# Patient Record
Sex: MALE | Race: WHITE | NOT HISPANIC OR LATINO | URBAN - METROPOLITAN AREA
[De-identification: names, ages, dates, MRNs, and addresses within clinical notes are randomized per-mention and may not be internally consistent; named-entity substitution may affect disease eponyms.]

---

## 2017-03-20 ENCOUNTER — FOLLOW UP ESTABLISHED (OUTPATIENT)
Dept: URBAN - METROPOLITAN AREA CLINIC 10 | Facility: CLINIC | Age: 72
End: 2017-03-20
Payer: COMMERCIAL

## 2017-03-20 PROCEDURE — 92012 INTRM OPH EXAM EST PATIENT: CPT | Performed by: OPTOMETRIST

## 2017-03-20 PROCEDURE — 92083 EXTENDED VISUAL FIELD XM: CPT | Performed by: OPTOMETRIST

## 2017-03-20 PROCEDURE — 92133 CPTRZD OPH DX IMG PST SGM ON: CPT | Performed by: OPTOMETRIST

## 2017-03-20 ASSESSMENT — INTRAOCULAR PRESSURE
OD: 13
OS: 14

## 2017-10-03 ENCOUNTER — FOLLOW UP ESTABLISHED (OUTPATIENT)
Dept: URBAN - METROPOLITAN AREA CLINIC 10 | Facility: CLINIC | Age: 72
End: 2017-10-03
Payer: COMMERCIAL

## 2017-10-03 PROCEDURE — 92133 CPTRZD OPH DX IMG PST SGM ON: CPT | Performed by: OPTOMETRIST

## 2017-10-03 PROCEDURE — 92014 COMPRE OPH EXAM EST PT 1/>: CPT | Performed by: OPTOMETRIST

## 2017-10-03 ASSESSMENT — VISUAL ACUITY
OD: 20/20
OS: 20/20

## 2017-10-03 ASSESSMENT — KERATOMETRY
OD: 42.63
OS: 42.25

## 2017-10-03 ASSESSMENT — INTRAOCULAR PRESSURE
OD: 14
OS: 13

## 2018-05-11 ENCOUNTER — FOLLOW UP ESTABLISHED (OUTPATIENT)
Dept: URBAN - METROPOLITAN AREA CLINIC 10 | Facility: CLINIC | Age: 73
End: 2018-05-11
Payer: COMMERCIAL

## 2018-05-11 PROCEDURE — 92082 INTERMEDIATE VISUAL FIELD XM: CPT | Performed by: OPTOMETRIST

## 2018-05-11 PROCEDURE — 99213 OFFICE O/P EST LOW 20 MIN: CPT | Performed by: OPTOMETRIST

## 2018-05-11 ASSESSMENT — KERATOMETRY
OD: 42.75
OS: 42.25

## 2018-05-11 ASSESSMENT — VISUAL ACUITY
OD: 20/20
OS: 20/20

## 2018-05-11 ASSESSMENT — INTRAOCULAR PRESSURE
OS: 14
OD: 14

## 2018-12-11 ENCOUNTER — FOLLOW UP ESTABLISHED (OUTPATIENT)
Dept: URBAN - METROPOLITAN AREA CLINIC 10 | Facility: CLINIC | Age: 73
End: 2018-12-11
Payer: COMMERCIAL

## 2018-12-11 DIAGNOSIS — H04.123 TEAR FILM INSUFFICIENCY OF BILATERAL LACRIMAL GLANDS: ICD-10-CM

## 2018-12-11 PROCEDURE — 92250 FUNDUS PHOTOGRAPHY W/I&R: CPT | Performed by: OPTOMETRIST

## 2018-12-11 PROCEDURE — 92014 COMPRE OPH EXAM EST PT 1/>: CPT | Performed by: OPTOMETRIST

## 2018-12-11 ASSESSMENT — INTRAOCULAR PRESSURE
OD: 18
OS: 18

## 2018-12-11 ASSESSMENT — VISUAL ACUITY
OD: 20/20
OS: 20/20

## 2018-12-11 ASSESSMENT — KERATOMETRY
OD: 42.63
OS: 42.13

## 2019-06-11 ENCOUNTER — FOLLOW UP ESTABLISHED (OUTPATIENT)
Dept: URBAN - METROPOLITAN AREA CLINIC 10 | Facility: CLINIC | Age: 74
End: 2019-06-11
Payer: COMMERCIAL

## 2019-06-11 PROCEDURE — 92133 CPTRZD OPH DX IMG PST SGM ON: CPT | Performed by: OPTOMETRIST

## 2019-06-11 PROCEDURE — 92012 INTRM OPH EXAM EST PATIENT: CPT | Performed by: OPTOMETRIST

## 2019-06-11 PROCEDURE — 92083 EXTENDED VISUAL FIELD XM: CPT | Performed by: OPTOMETRIST

## 2019-06-11 ASSESSMENT — INTRAOCULAR PRESSURE
OS: 16
OD: 16

## 2019-10-09 ENCOUNTER — FOLLOW UP ESTABLISHED (OUTPATIENT)
Dept: URBAN - METROPOLITAN AREA CLINIC 10 | Facility: CLINIC | Age: 74
End: 2019-10-09
Payer: COMMERCIAL

## 2019-10-09 PROCEDURE — 92014 COMPRE OPH EXAM EST PT 1/>: CPT | Performed by: OPTOMETRIST

## 2019-10-09 ASSESSMENT — VISUAL ACUITY
OS: 20/20
OD: 20/25

## 2019-10-09 ASSESSMENT — KERATOMETRY: OD: 42.63

## 2019-10-09 ASSESSMENT — INTRAOCULAR PRESSURE
OD: 16
OS: 16

## 2020-07-27 ENCOUNTER — FOLLOW UP ESTABLISHED (OUTPATIENT)
Dept: URBAN - METROPOLITAN AREA CLINIC 10 | Facility: CLINIC | Age: 75
End: 2020-07-27
Payer: COMMERCIAL

## 2020-07-27 DIAGNOSIS — H25.813 COMBINED FORMS OF AGE-RELATED CATARACT, BILATERAL: ICD-10-CM

## 2020-07-27 DIAGNOSIS — H00.012 HORDEOLUM, RIGHT LOWER LID: ICD-10-CM

## 2020-07-27 PROCEDURE — 92083 EXTENDED VISUAL FIELD XM: CPT | Performed by: OPTOMETRIST

## 2020-07-27 PROCEDURE — 92012 INTRM OPH EXAM EST PATIENT: CPT | Performed by: OPTOMETRIST

## 2020-07-27 PROCEDURE — 92134 CPTRZ OPH DX IMG PST SGM RTA: CPT | Performed by: OPTOMETRIST

## 2020-07-27 PROCEDURE — 92133 CPTRZD OPH DX IMG PST SGM ON: CPT | Performed by: OPTOMETRIST

## 2020-07-27 ASSESSMENT — INTRAOCULAR PRESSURE
OS: 14
OD: 14

## 2020-09-11 ENCOUNTER — FOLLOW UP ESTABLISHED (OUTPATIENT)
Dept: URBAN - METROPOLITAN AREA CLINIC 10 | Facility: CLINIC | Age: 75
End: 2020-09-11
Payer: COMMERCIAL

## 2020-09-11 DIAGNOSIS — H40.013 OPEN ANGLE WITH BORDERLINE FINDINGS, LOW RISK, BILATERAL: Primary | ICD-10-CM

## 2020-09-11 DIAGNOSIS — H04.561 STENOSIS OF RIGHT LACRIMAL PUNCTUM: ICD-10-CM

## 2020-09-11 PROCEDURE — 92014 COMPRE OPH EXAM EST PT 1/>: CPT | Performed by: OPTOMETRIST

## 2020-09-11 ASSESSMENT — INTRAOCULAR PRESSURE
OS: 16
OD: 16

## 2020-09-11 ASSESSMENT — VISUAL ACUITY
OS: 20/25
OD: 20/40

## 2021-02-03 ENCOUNTER — OFFICE VISIT (OUTPATIENT)
Dept: URBAN - METROPOLITAN AREA CLINIC 13 | Facility: CLINIC | Age: 76
End: 2021-02-03
Payer: COMMERCIAL

## 2021-02-03 DIAGNOSIS — H27.00 APHAKIA: ICD-10-CM

## 2021-02-03 PROCEDURE — 76512 OPH US DX B-SCAN: CPT | Performed by: OPHTHALMOLOGY

## 2021-02-03 PROCEDURE — 99204 OFFICE O/P NEW MOD 45 MIN: CPT | Performed by: OPHTHALMOLOGY

## 2021-02-03 PROCEDURE — 92134 CPTRZ OPH DX IMG PST SGM RTA: CPT | Performed by: OPHTHALMOLOGY

## 2021-02-03 RX ORDER — PREDNISOLONE ACETATE 10 MG/ML
1 % SUSPENSION/ DROPS OPHTHALMIC
Qty: 5 | Refills: 3 | Status: INACTIVE
Start: 2021-02-03 | End: 2021-03-01

## 2021-02-03 RX ORDER — OFLOXACIN 3 MG/ML
0.3 % SOLUTION/ DROPS OPHTHALMIC
Qty: 5 | Refills: 0 | Status: INACTIVE
Start: 2021-02-03 | End: 2021-03-01

## 2021-02-03 ASSESSMENT — INTRAOCULAR PRESSURE
OS: 13
OD: 15

## 2021-02-03 NOTE — IMPRESSION/PLAN
Impression: Lens fragment in eye following cataract surgery: H59.029. Plan: Patient referred for evaluation of RLF. Exam demonstrates corneal dfolds, normal IOP, likely aphakia, and hazy view posteriorly. Bscan demonstrates RLF without RT or RD. Recommend surgery. R/B/A discussed and patient elects to proceed. Will increase PF to 6x/day. Will plan for secondary IOL placement at a later surgery (either ACIOL or scleral fixated IOL).   

RTC: 23 PPV/poss phacofrag/ EL x RLF OD

## 2021-02-03 NOTE — IMPRESSION/PLAN
Impression: Glaucoma Suspect: H40.9. Plan: Exam demonstrates mild glaucomatous cupping OS. Fortunately, there is no NVI today. IOP is WNL. Discussed surgery vs laser vs gtts vs observation.   Recommend observation for now

## 2021-02-08 ENCOUNTER — Encounter (OUTPATIENT)
Dept: URBAN - METROPOLITAN AREA EXTERNAL CLINIC 14 | Facility: EXTERNAL CLINIC | Age: 76
End: 2021-02-08
Payer: COMMERCIAL

## 2021-02-08 PROCEDURE — 66852 REMOVAL OF LENS MATERIAL: CPT | Performed by: OPHTHALMOLOGY

## 2021-02-09 ENCOUNTER — POST-OPERATIVE VISIT (OUTPATIENT)
Dept: URBAN - METROPOLITAN AREA CLINIC 13 | Facility: CLINIC | Age: 76
End: 2021-02-09
Payer: COMMERCIAL

## 2021-02-09 PROCEDURE — 99024 POSTOP FOLLOW-UP VISIT: CPT | Performed by: OPHTHALMOLOGY

## 2021-02-09 ASSESSMENT — INTRAOCULAR PRESSURE
OS: 11
OD: 16

## 2021-02-09 NOTE — IMPRESSION/PLAN
Impression: S/P 23 PPV/poss phacofrag/ EL x RLF OD - 1 Day. Lens fragment in eye following cataract surgery  H59.029. Excellent post op course. Post operative instructions reviewed. Condition is improving. No signs or symptoms of infection. 
- increase PF to 6x/day, ok off IOP drops for now Plan: RTC: 1 week POS

## 2021-02-16 ENCOUNTER — POST-OPERATIVE VISIT (OUTPATIENT)
Dept: URBAN - METROPOLITAN AREA CLINIC 13 | Facility: CLINIC | Age: 76
End: 2021-02-16
Payer: COMMERCIAL

## 2021-02-16 PROCEDURE — 99024 POSTOP FOLLOW-UP VISIT: CPT | Performed by: OPHTHALMOLOGY

## 2021-02-16 ASSESSMENT — INTRAOCULAR PRESSURE
OS: 15
OD: 16

## 2021-02-16 NOTE — IMPRESSION/PLAN
Impression: S/P 23 PPV/poss phacofrag/ EL x RLF OD - 8 Days. Lens fragment in eye following cataract surgery  H59.029.  Excellent post op course   Post operative instructions reviewed - Condition is improving - Plan: 3-4 wks with POS, with OCT OU

## 2021-03-01 ENCOUNTER — POST-OPERATIVE VISIT (OUTPATIENT)
Dept: URBAN - METROPOLITAN AREA CLINIC 13 | Facility: CLINIC | Age: 76
End: 2021-03-01
Payer: COMMERCIAL

## 2021-03-01 PROCEDURE — 99024 POSTOP FOLLOW-UP VISIT: CPT | Performed by: OPHTHALMOLOGY

## 2021-03-01 ASSESSMENT — INTRAOCULAR PRESSURE
OD: 10
OS: 12

## 2021-03-01 NOTE — IMPRESSION/PLAN
Impression: S/P 23 PPV/poss phacofrag/ EL x RLF OD - 21 Days. Lens fragment in eye following cataract surgery  H59.029. Excellent post op course   Post operative instructions reviewed - Condition is improving - Plan: No s/s of RD/infection VA/IOP acceptable OCT OU - mild diffuse thickening OD, no SRF/IRF /292 Does have what looks like rebound iritis after his recent taper off PF. Currently only taking Vigamox QID and timolol/dorzolamide/alphagan BID (brought his drops with him). Noticed onset on Saturday. Timing since surgery and since taper off PF is suggestive of rebound iritis. Try PF q1-2 OD WA. Stressed importance of f/u tomorrow. RBA's of plan vs T&I today d/w patient who agrees. He voices understanding of importance of adherence and f/u. 

Tomorrow with SI

## 2021-03-02 ENCOUNTER — POST-OPERATIVE VISIT (OUTPATIENT)
Dept: URBAN - METROPOLITAN AREA CLINIC 13 | Facility: CLINIC | Age: 76
End: 2021-03-02
Payer: COMMERCIAL

## 2021-03-02 PROCEDURE — 99024 POSTOP FOLLOW-UP VISIT: CPT | Performed by: OPHTHALMOLOGY

## 2021-03-02 PROCEDURE — 67028 INJECTION EYE DRUG: CPT | Performed by: OPHTHALMOLOGY

## 2021-03-02 PROCEDURE — 65800 DRAINAGE OF EYE: CPT | Performed by: OPHTHALMOLOGY

## 2021-03-02 ASSESSMENT — INTRAOCULAR PRESSURE
OD: 9
OS: 12

## 2021-03-03 ENCOUNTER — POST-OPERATIVE VISIT (OUTPATIENT)
Dept: URBAN - METROPOLITAN AREA CLINIC 13 | Facility: CLINIC | Age: 76
End: 2021-03-03
Payer: COMMERCIAL

## 2021-03-03 PROCEDURE — 99024 POSTOP FOLLOW-UP VISIT: CPT | Performed by: OPHTHALMOLOGY

## 2021-03-03 ASSESSMENT — INTRAOCULAR PRESSURE
OS: 13
OD: 14

## 2021-03-03 NOTE — IMPRESSION/PLAN
Impression: 1) S/P 23 PPV/poss phacofrag/ EL x RLF OD - 23 Days. Other endophthalmitis  H44.19. Excellent post op course   Post operative instructions reviewed - Condition is improving -
2) Endophthalmitis after suture removal -s/p tap/inject 3/2/2021 Plan: 1) Doing well 2) Pain improved after tap/inject. Increase PF to q1-2 hr while awake. Continue Vigamox RTC 1 wk EP, with OCT OU

## 2021-03-09 ENCOUNTER — POST-OPERATIVE VISIT (OUTPATIENT)
Dept: URBAN - METROPOLITAN AREA CLINIC 13 | Facility: CLINIC | Age: 76
End: 2021-03-09
Payer: COMMERCIAL

## 2021-03-09 DIAGNOSIS — H44.19 OTHER ENDOPHTHALMITIS: ICD-10-CM

## 2021-03-09 PROCEDURE — 99024 POSTOP FOLLOW-UP VISIT: CPT | Performed by: OPHTHALMOLOGY

## 2021-03-09 PROCEDURE — 92134 CPTRZ OPH DX IMG PST SGM RTA: CPT | Performed by: OPHTHALMOLOGY

## 2021-03-09 RX ORDER — PREDNISOLONE ACETATE 10 MG/ML
1 % SUSPENSION/ DROPS OPHTHALMIC
Qty: 5 | Refills: 3 | Status: INACTIVE
Start: 2021-03-09 | End: 2021-06-09

## 2021-03-09 RX ORDER — DORZOLAMIDE HYDROCHLORIDE AND TIMOLOL MALEATE 20; 5 MG/ML; MG/ML
SOLUTION/ DROPS OPHTHALMIC
Qty: 5 | Refills: 2 | Status: INACTIVE
Start: 2021-03-09 | End: 2021-07-20

## 2021-03-09 RX ORDER — PREDNISOLONE ACETATE 10 MG/ML
1 % SUSPENSION/ DROPS OPHTHALMIC
Qty: 5 | Refills: 3 | Status: INACTIVE
Start: 2021-03-09 | End: 2021-03-09

## 2021-03-09 RX ORDER — BRIMONIDINE TARTRATE, TIMOLOL MALEATE 2; 5 MG/ML; MG/ML
SOLUTION/ DROPS OPHTHALMIC
Qty: 5 | Refills: 2 | Status: INACTIVE
Start: 2021-03-09 | End: 2021-03-09

## 2021-03-09 ASSESSMENT — INTRAOCULAR PRESSURE
OS: 14
OD: 30

## 2021-03-09 NOTE — IMPRESSION/PLAN
Impression: S/P 23 PPV/poss phacofrag/ EL x RLF OD - 29 Days. Other endophthalmitis  H44.19. Excellent post op course   Post operative instructions reviewed - Condition is improving -IOP elevated due to steroid response.   Will initiate PF taper and start Cosopt BID Plan: 1 wk POS with OCT OU

## 2021-03-16 ENCOUNTER — POST-OPERATIVE VISIT (OUTPATIENT)
Dept: URBAN - METROPOLITAN AREA CLINIC 13 | Facility: CLINIC | Age: 76
End: 2021-03-16
Payer: COMMERCIAL

## 2021-03-16 PROCEDURE — 99024 POSTOP FOLLOW-UP VISIT: CPT | Performed by: OPHTHALMOLOGY

## 2021-03-16 PROCEDURE — 92134 CPTRZ OPH DX IMG PST SGM RTA: CPT | Performed by: OPHTHALMOLOGY

## 2021-03-16 ASSESSMENT — INTRAOCULAR PRESSURE
OS: 12
OD: 14

## 2021-03-16 NOTE — IMPRESSION/PLAN
Impression: 1) S/P 23 PPV/poss phacofrag/ EL x RLF OD . Lens fragment in eye following cataract surgery  H59.029. Excellent post op course   Post operative instructions reviewed - Condition is improving -
2) Post suture removal endophthalmitis- inflammation and vision continues to improve. IOP is improved after PF taper and initiation of Cosopt. Cont Cosopt and PF BID.  Plan: RTC: 2 wks POS with OCT OU

## 2021-03-30 ENCOUNTER — POST-OPERATIVE VISIT (OUTPATIENT)
Dept: URBAN - METROPOLITAN AREA CLINIC 13 | Facility: CLINIC | Age: 76
End: 2021-03-30
Payer: COMMERCIAL

## 2021-03-30 PROCEDURE — 99024 POSTOP FOLLOW-UP VISIT: CPT | Performed by: OPHTHALMOLOGY

## 2021-03-30 ASSESSMENT — INTRAOCULAR PRESSURE
OS: 12
OD: 11

## 2021-03-30 NOTE — IMPRESSION/PLAN
Impression: S/P 23 PPV/poss phacofrag/ EL x RLF OD - 50 Days. Lens fragment in eye following cataract surgery  H59.029. Excellent post op course   Post operative instructions reviewed - Condition is improving - Plan: Taper PF from BID to off (1gtt/day/2 wks). Continue Brimonidine RTC 1 mo POS with OCT

## 2021-04-27 ENCOUNTER — POST-OPERATIVE VISIT (OUTPATIENT)
Dept: URBAN - METROPOLITAN AREA CLINIC 13 | Facility: CLINIC | Age: 76
End: 2021-04-27
Payer: COMMERCIAL

## 2021-04-27 PROCEDURE — 92134 CPTRZ OPH DX IMG PST SGM RTA: CPT | Performed by: OPHTHALMOLOGY

## 2021-04-27 PROCEDURE — 99024 POSTOP FOLLOW-UP VISIT: CPT | Performed by: OPHTHALMOLOGY

## 2021-04-27 ASSESSMENT — INTRAOCULAR PRESSURE
OS: 12
OD: 15

## 2021-04-27 NOTE — IMPRESSION/PLAN
Impression: S/P 23 PPV/poss phacofrag/ EL x RLF OD - 78 Days. Lens fragment in eye following cataract surgery  H59.029. Excellent post op course. Post operative instructions reviewed. Condition is improving. Plan: Ok to d/c PF. Continue Brimonidine. 

RTC 6 mo with OCT OU

## 2021-06-09 ENCOUNTER — OFFICE VISIT (OUTPATIENT)
Dept: URBAN - METROPOLITAN AREA CLINIC 13 | Facility: CLINIC | Age: 76
End: 2021-06-09
Payer: COMMERCIAL

## 2021-06-09 DIAGNOSIS — H59.029 LENS FRAGMENT IN EYE FOLLOWING CATARACT SURGERY: ICD-10-CM

## 2021-06-09 PROCEDURE — 92014 COMPRE OPH EXAM EST PT 1/>: CPT | Performed by: OPHTHALMOLOGY

## 2021-06-09 PROCEDURE — 92134 CPTRZ OPH DX IMG PST SGM RTA: CPT | Performed by: OPHTHALMOLOGY

## 2021-06-09 RX ORDER — PREDNISOLONE ACETATE 10 MG/ML
1 % SUSPENSION/ DROPS OPHTHALMIC
Qty: 5 | Refills: 3 | Status: INACTIVE
Start: 2021-06-09 | End: 2021-08-17

## 2021-06-09 ASSESSMENT — INTRAOCULAR PRESSURE
OS: 13
OD: 12

## 2021-06-09 NOTE — IMPRESSION/PLAN
Impression: Lens fragment in eye following cataract surgery  H59.029.
-s/p 23 PPV/poss phacofrag/EL OD (02/08/2021)-SI Plan: See plan for inflammation. No obvious RLF present to explain recurrent inflammation. Continue Cosopt BID. IOP ok.

## 2021-06-09 NOTE — IMPRESSION/PLAN
Impression: Vitreous degeneration, left eye: H43.812. Plan: Exam demonstrates a PVD without any RD or RT on exam.  Discussed R/B/A of PPV vs observation for the floaters. The floaters are not debilitating and so observation was recommended. Reassurance provided. RDW discussed. Precautions discussed with the patient.

## 2021-06-23 ENCOUNTER — OFFICE VISIT (OUTPATIENT)
Dept: URBAN - METROPOLITAN AREA CLINIC 13 | Facility: CLINIC | Age: 76
End: 2021-06-23
Payer: COMMERCIAL

## 2021-06-23 PROCEDURE — 92012 INTRM OPH EXAM EST PATIENT: CPT | Performed by: OPHTHALMOLOGY

## 2021-06-23 PROCEDURE — 92134 CPTRZ OPH DX IMG PST SGM RTA: CPT | Performed by: OPHTHALMOLOGY

## 2021-06-23 RX ORDER — BRIMONIDINE TARTRATE 2 MG/ML
0.2 % SOLUTION/ DROPS OPHTHALMIC
Qty: 5 | Refills: 3 | Status: INACTIVE
Start: 2021-06-23 | End: 2021-09-21

## 2021-06-23 ASSESSMENT — INTRAOCULAR PRESSURE
OS: 13
OD: 13

## 2021-06-23 NOTE — IMPRESSION/PLAN
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 Plan: Patient presented with recurrent pain on 6/9/2021. Patient notes pain improved with PF QID, and he is now tapered to BID. Exam and OCT demonstrate stable inflammation and worse CME. Recommend increased PF to QID.   If no impovement with drops, may require repeat PPV to remove ERM and search for any remaining RLF

RTC 1 mo with OCT OU

## 2021-06-23 NOTE — IMPRESSION/PLAN
Impression: Glaucoma Suspect: H40.9. Plan: Exam demonstrates mild glaucomatous cupping OS. Fortunately, there is no NVI today. IOP is WNL. Discussed surgery vs laser vs gtts vs observation.   Recommend add brimonidine to Cosopt for now

## 2021-07-20 ENCOUNTER — OFFICE VISIT (OUTPATIENT)
Dept: URBAN - METROPOLITAN AREA CLINIC 13 | Facility: CLINIC | Age: 76
End: 2021-07-20
Payer: COMMERCIAL

## 2021-07-20 PROCEDURE — 92134 CPTRZ OPH DX IMG PST SGM RTA: CPT | Performed by: OPHTHALMOLOGY

## 2021-07-20 PROCEDURE — 92012 INTRM OPH EXAM EST PATIENT: CPT | Performed by: OPHTHALMOLOGY

## 2021-07-20 RX ORDER — DORZOLAMIDE HYDROCHLORIDE AND TIMOLOL MALEATE 20; 5 MG/ML; MG/ML
SOLUTION/ DROPS OPHTHALMIC
Qty: 5 | Refills: 2 | Status: INACTIVE
Start: 2021-07-20 | End: 2021-09-21

## 2021-07-20 ASSESSMENT — INTRAOCULAR PRESSURE
OD: 12
OS: 13

## 2021-07-20 NOTE — IMPRESSION/PLAN
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 Plan: Patient presented with recurrent pain on 6/9/2021. Patient notes pain improved with increasing PF from BID to QID. IOP ok on gtts. Exam and OCT demonstrate improved inflammation and CME. Recommend taper PF to TID. If no improvement with drops, may require repeat PPV to remove ERM and search for any remaining RLF; this does not appear to be related to persistent infection as patient's symptoms appear to improve without antibiotics.   

RTC 1 mo with OCT OU

## 2021-08-17 ENCOUNTER — OFFICE VISIT (OUTPATIENT)
Dept: URBAN - METROPOLITAN AREA CLINIC 13 | Facility: CLINIC | Age: 76
End: 2021-08-17
Payer: COMMERCIAL

## 2021-08-17 PROCEDURE — 92012 INTRM OPH EXAM EST PATIENT: CPT | Performed by: OPHTHALMOLOGY

## 2021-08-17 PROCEDURE — 92134 CPTRZ OPH DX IMG PST SGM RTA: CPT | Performed by: OPHTHALMOLOGY

## 2021-08-17 RX ORDER — PREDNISOLONE ACETATE 10 MG/ML
1 % SUSPENSION/ DROPS OPHTHALMIC
Qty: 5 | Refills: 3 | Status: INACTIVE
Start: 2021-08-17 | End: 2022-02-18

## 2021-08-17 ASSESSMENT — INTRAOCULAR PRESSURE
OS: 15
OD: 13

## 2021-08-17 NOTE — IMPRESSION/PLAN
Impression: Glaucoma Suspect: H40.9.
- possible allergy to brimonidine Plan: Exam demonstrates mild glaucomatous cupping OU. Fortunately, there is no NVI today. IOP is WNL. Discussed surgery vs laser vs gtts vs observation. Recommend cont Cosopt for now and d/c brimonidine.

## 2021-08-17 NOTE — IMPRESSION/PLAN
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 Plan: Patient presented with recurrent pain on 6/9/2021. Patient notes pain improved with increasing PF from BID to QID and this has remained improved on PF TID. IOP ok on gtts. Exam and OCT demonstrate improved inflammation and CME. Recommend continue PF TID. If no improvement with drops, may require repeat PPV to remove ERM and search for any remaining RLF; this does not appear to be related to persistent infection as patient's symptoms appear to improve without antibiotics. Patient notes persistent discharge and exam demonstrates papillae. Rec d/c brimonidine.   

RTC 1 mo with OCT OU

## 2021-09-21 ENCOUNTER — OFFICE VISIT (OUTPATIENT)
Dept: URBAN - METROPOLITAN AREA CLINIC 13 | Facility: CLINIC | Age: 76
End: 2021-09-21
Payer: COMMERCIAL

## 2021-09-21 PROCEDURE — 92134 CPTRZ OPH DX IMG PST SGM RTA: CPT | Performed by: OPHTHALMOLOGY

## 2021-09-21 PROCEDURE — 92012 INTRM OPH EXAM EST PATIENT: CPT | Performed by: OPHTHALMOLOGY

## 2021-09-21 ASSESSMENT — INTRAOCULAR PRESSURE
OS: 14
OD: 19

## 2021-09-21 NOTE — IMPRESSION/PLAN
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 Patient presented with recurrent pain on 6/9/2021. Plan:  Patient notes pain has remained improved on PF TID. IOP ok on gtts (off brimonidine due to allergy). Exam and OCT demonstrate improved inflammation and CME. Recommend taper of PF from TID to BID. If no improvement with drops, may require repeat PPV to remove ERM and search for any remaining RLF; this does not appear to be related to persistent infection as patient's symptoms appear to improve without antibiotics. Discharge is improving after stopping brimonidine. He has also self d/c'ed cosopt. Rec WCs as well.   Cont PF ATs

RTC 3-4 wks with OCT OU

## 2021-09-21 NOTE — IMPRESSION/PLAN
Impression: Glaucoma Suspect: H40.9.
- possible allergy to brimonidine Plan: Exam demonstrates mild glaucomatous cupping OU. Fortunately, there is no NVI today. IOP is ok. Discussed surgery vs laser vs gtts vs observation. Recommend cont off Cosopt for now and d/c brimonidine.

## 2021-10-12 ENCOUNTER — OFFICE VISIT (OUTPATIENT)
Dept: URBAN - METROPOLITAN AREA CLINIC 13 | Facility: CLINIC | Age: 76
End: 2021-10-12
Payer: COMMERCIAL

## 2021-10-12 DIAGNOSIS — H04.209 EPIPHORA: ICD-10-CM

## 2021-10-12 DIAGNOSIS — H43.812 VITREOUS DEGENERATION, LEFT EYE: ICD-10-CM

## 2021-10-12 PROCEDURE — 92134 CPTRZ OPH DX IMG PST SGM RTA: CPT | Performed by: OPHTHALMOLOGY

## 2021-10-12 PROCEDURE — 92012 INTRM OPH EXAM EST PATIENT: CPT | Performed by: OPHTHALMOLOGY

## 2021-10-12 ASSESSMENT — INTRAOCULAR PRESSURE
OS: 15
OD: 19

## 2021-10-12 NOTE — IMPRESSION/PLAN
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 Patient presented with recurrent pain on 6/9/2021. Plan: Patient notes pain has remained improved on PF TID. IOP ok on gtts (off brimonidine due to allergy and self dc Cosot). Exam and OCT demonstrate improved inflammation and CME. Recommend taper of PF from TID to BID. If no improvement with drops, may require repeat PPV to remove ERM and search for any remaining RLF; this does not appear to be related to persistent infection as patient's symptoms appear to improve without antibiotics. Discharge is improving after stopping brimonidine and then after starting WC, but it still persists. He has also self d/c'ed cosopt.   Cont PF ATs

RTC 3-4 wks with OCT OU

## 2021-10-12 NOTE — IMPRESSION/PLAN
Impression: Lens fragment in eye following cataract surgery  H59.029.
-s/p 23 PPV/poss phacofrag/EL OD (02/08/2021)-SI Plan: See plan for inflammation. No obvious RLF present to explain recurrent inflammation. Continue PF TID. IOP ok.

## 2021-10-12 NOTE — IMPRESSION/PLAN
Impression: Epiphora: H04.209. Plan: Patient notes tearing. Exam demonstrates PEE and cyst in RLL punctuma. Discussed R/B/A of ATs, PFATs, Restasis, vs punctal plugs.  Discussed that he may require oculoplastics eval.

## 2021-11-09 ENCOUNTER — OFFICE VISIT (OUTPATIENT)
Dept: URBAN - METROPOLITAN AREA CLINIC 13 | Facility: CLINIC | Age: 76
End: 2021-11-09
Payer: COMMERCIAL

## 2021-11-09 DIAGNOSIS — H40.9 GLAUCOMA: Primary | ICD-10-CM

## 2021-11-09 PROCEDURE — 92134 CPTRZ OPH DX IMG PST SGM RTA: CPT | Performed by: OPHTHALMOLOGY

## 2021-11-09 PROCEDURE — 92014 COMPRE OPH EXAM EST PT 1/>: CPT | Performed by: OPHTHALMOLOGY

## 2021-11-09 ASSESSMENT — INTRAOCULAR PRESSURE
OD: 20
OS: 16

## 2021-11-09 NOTE — IMPRESSION/PLAN
Impression: Dry eye syndrome of bilateral lacrimal glands: H04.123. Plan: Patient notes tearing OD. This has improved with irrigation by Dr. Mayela Jerez of cyst overlying lacrimal duct. His symptoms have recurrent. Rec follow up with Dr. Mayela Jerez.

## 2021-11-09 NOTE — IMPRESSION/PLAN
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 Patient presented with recurrent pain on 6/9/2021. Plan: Patient notes pain has remained improved on PF BID. IOP ok off gtts (off brimonidine due to allergy and self dc Cosot). Exam and OCT demonstrate improved inflammation and CME. Discussed R/B/A of cont PF BID vs taper to QD. Patient elects to continue BID. If no improvement with drops, may require repeat PPV to remove ERM and search for any remaining RLF; this does not appear to be related to persistent infection as patient's symptoms appear to improve without antibiotics.   


RTC 3 mo with OCT OU

## 2021-11-09 NOTE — IMPRESSION/PLAN
Impression: Lens fragment in eye following cataract surgery  H59.029.
-s/p 23 PPV/poss phacofrag/EL OD (02/08/2021)-SI Plan: See plan for inflammation. No obvious RLF present to explain recurrent inflammation. Continue PF BID. IOP ok.

## 2022-02-08 ENCOUNTER — OFFICE VISIT (OUTPATIENT)
Dept: URBAN - METROPOLITAN AREA CLINIC 13 | Facility: CLINIC | Age: 77
End: 2022-02-08
Payer: COMMERCIAL

## 2022-02-08 PROCEDURE — 92134 CPTRZ OPH DX IMG PST SGM RTA: CPT | Performed by: OPHTHALMOLOGY

## 2022-02-08 PROCEDURE — 99214 OFFICE O/P EST MOD 30 MIN: CPT | Performed by: OPHTHALMOLOGY

## 2022-02-08 ASSESSMENT — INTRAOCULAR PRESSURE
OD: 20
OS: 16

## 2022-02-08 NOTE — IMPRESSION/PLAN
Impression: Dry eye syndrome of bilateral lacrimal glands: H04.123. Plan: Patient notes tearing OD. This has improved with irrigation by Dr. Osbaldo Lord of cyst overlying lacrimal duct. His symptoms have recurrent. Rec follow up with Dr. Osbaldo Lord.

## 2022-02-08 NOTE — IMPRESSION/PLAN
Impression: Glaucoma Suspect: H40.9.
- possible allergy to brimonidine Plan: Exam demonstrates mild glaucomatous cupping OU. Fortunately, there is no NVI today. IOP is borderline OD>OS off Cosopt. Discussed surgery vs laser vs gtts vs observation. Recommend cont off Cosopt for now and d/c brimonidine.

## 2022-02-08 NOTE — IMPRESSION/PLAN
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 Patient presented with recurrent pain on 6/9/2021. Plan: Patient stopped PF ~ 1 wk ago. He denies any recurrent symptoms off tx. Exam and OCT demonstrate improved inflammation and CME. Discussed R/B/A of cont off PF vs restart PF vs change to QD. Patient elects to continue off drops. If no improvement with drops, may require repeat PPV to remove ERM and search for any remaining RLF; this does not appear to be related to persistent infection as patient's symptoms appear to improve without antibiotics.   

RTC 4-6 wks with OCT OU

## 2022-03-08 ENCOUNTER — OFFICE VISIT (OUTPATIENT)
Dept: URBAN - METROPOLITAN AREA CLINIC 13 | Facility: CLINIC | Age: 77
End: 2022-03-08
Payer: COMMERCIAL

## 2022-03-08 DIAGNOSIS — H30.91 UNSPECIFIED CHORIORETINAL INFLAMMATION, RIGHT EYE: Primary | ICD-10-CM

## 2022-03-08 PROCEDURE — 92134 CPTRZ OPH DX IMG PST SGM RTA: CPT | Performed by: OPHTHALMOLOGY

## 2022-03-08 PROCEDURE — 92014 COMPRE OPH EXAM EST PT 1/>: CPT | Performed by: OPHTHALMOLOGY

## 2022-03-08 ASSESSMENT — INTRAOCULAR PRESSURE
OS: 15
OD: 18

## 2022-03-08 NOTE — IMPRESSION/PLAN
Impression: Lens fragment in eye following cataract surgery  H59.029.
-s/p 23 PPV/poss phacofrag/EL OD (02/08/2021)-SI Plan: See plan for inflammation. No obvious RLF present to explain recurrent inflammation.

## 2022-03-08 NOTE — IMPRESSION/PLAN
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 Patient presented with recurrent pain on 6/9/2021. Plan: Patient has been off of PF ~ 5-6 wk ago. He denies any recurrent symptoms off tx. Exam and OCT demonstrate improved inflammation and CME. Discussed R/B/A of cont off PF vs restart PF vs change to QD. Patient elects to continue off drops. If no improvement with drops, may require repeat PPV to remove ERM and search for any remaining RLF; this does not appear to be related to persistent infection as patient's symptoms appear to improve without antibiotics.   

RTC 4-6 mo with OCT OU

## 2022-03-08 NOTE — IMPRESSION/PLAN
Impression: Dry eye syndrome of bilateral lacrimal glands: H04.123. Plan: Patient notes tearing OD. This has improved with irrigation by Dr. Shi Madrid of cyst overlying lacrimal duct. His symptoms have recurrent. Rec follow up with Dr. Shi Madrid.

## 2022-07-05 ENCOUNTER — OFFICE VISIT (OUTPATIENT)
Dept: URBAN - METROPOLITAN AREA CLINIC 13 | Facility: CLINIC | Age: 77
End: 2022-07-05
Payer: COMMERCIAL

## 2022-07-05 DIAGNOSIS — H59.029 LENS FRAGMENT IN EYE FOLLOWING CATARACT SURGERY: ICD-10-CM

## 2022-07-05 DIAGNOSIS — H43.812 VITREOUS DEGENERATION, LEFT EYE: ICD-10-CM

## 2022-07-05 DIAGNOSIS — H30.91 UNSPECIFIED CHORIORETINAL INFLAMMATION, RIGHT EYE: Primary | ICD-10-CM

## 2022-07-05 DIAGNOSIS — H04.123 DRY EYE SYNDROME OF BILATERAL LACRIMAL GLANDS: ICD-10-CM

## 2022-07-05 DIAGNOSIS — H40.9 GLAUCOMA: ICD-10-CM

## 2022-07-05 PROCEDURE — 99214 OFFICE O/P EST MOD 30 MIN: CPT | Performed by: OPHTHALMOLOGY

## 2022-07-05 PROCEDURE — 92134 CPTRZ OPH DX IMG PST SGM RTA: CPT | Performed by: OPHTHALMOLOGY

## 2022-07-05 ASSESSMENT — INTRAOCULAR PRESSURE
OS: 13
OD: 20

## 2022-07-05 NOTE — IMPRESSION/PLAN
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 Patient presented with recurrent pain on 6/9/2021. Plan: Patient has been off of PF 4 mo ago. He denies any recurrent symptoms off tx. Exam and OCT demonstrate improved inflammation and CME. Discussed R/B/A of cont off PF vs restart PF vs change to QD. Patient elects to continue off drops. If no improvement with drops, may require repeat PPV to remove ERM and search for any remaining RLF; this does not appear to be related to persistent infection as patient's symptoms appear to improve without antibiotics.   
 
RTC 6 mo with OCT OU (mac and RNFL)

## 2022-07-05 NOTE — IMPRESSION/PLAN
Impression: Glaucoma Suspect: H40.9.
- possible allergy to brimonidine Plan: Exam demonstrates mild glaucomatous cupping OU. Fortunately, there is no NVI today. IOP is borderline OD>OS off Cosopt. Discussed surgery vs laser vs gtts vs observation. Recommend cont off Cosopt for now and d/c brimonidine. Rec follow up with Dr. Adrian Parsons.

## 2022-07-05 NOTE — IMPRESSION/PLAN
Impression: Dry eye syndrome of bilateral lacrimal glands: H04.123. Plan: Patient notes tearing OD. This has improved with irrigation by Dr. Alan Dominguez of cyst overlying lacrimal duct. His symptoms have recurrent. He has an upcoming appt with oculoplastics for possible DCR. Also recommend switch to PFATs, as he continues with PEE OD>OS.

## 2023-01-03 ENCOUNTER — OFFICE VISIT (OUTPATIENT)
Dept: URBAN - METROPOLITAN AREA CLINIC 13 | Facility: CLINIC | Age: 78
End: 2023-01-03
Payer: COMMERCIAL

## 2023-01-03 DIAGNOSIS — H04.123 DRY EYE SYNDROME OF BILATERAL LACRIMAL GLANDS: ICD-10-CM

## 2023-01-03 DIAGNOSIS — H59.029 LENS FRAGMENT IN EYE FOLLOWING CATARACT SURGERY: ICD-10-CM

## 2023-01-03 DIAGNOSIS — H30.91 UNSPECIFIED CHORIORETINAL INFLAMMATION, RIGHT EYE: Primary | ICD-10-CM

## 2023-01-03 DIAGNOSIS — H43.812 VITREOUS DEGENERATION, LEFT EYE: ICD-10-CM

## 2023-01-03 DIAGNOSIS — H40.9 GLAUCOMA: ICD-10-CM

## 2023-01-03 PROCEDURE — 92133 CPTRZD OPH DX IMG PST SGM ON: CPT | Performed by: OPHTHALMOLOGY

## 2023-01-03 PROCEDURE — 92134 CPTRZ OPH DX IMG PST SGM RTA: CPT | Performed by: OPHTHALMOLOGY

## 2023-01-03 PROCEDURE — 92014 COMPRE OPH EXAM EST PT 1/>: CPT | Performed by: OPHTHALMOLOGY

## 2023-01-03 ASSESSMENT — INTRAOCULAR PRESSURE
OD: 23
OS: 17

## 2023-01-03 NOTE — IMPRESSION/PLAN
Impression: Glaucoma Suspect: H40.9.
- possible allergy to brimonidine RNFl 92/78 Plan: Exam demonstrates mild glaucomatous cupping OU. Fortunately, there is no NVI today. IOP is borderline OD>OS off Cosopt. Discussed surgery vs laser vs gtts vs observation. Recommend cont off Cosopt for now and d/c brimonidine.   Will refer for full evaluation

## 2023-01-03 NOTE — IMPRESSION/PLAN
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 Patient presented with recurrent pain on 6/9/2021. Plan: Patient has been using PF PRN for DG. At last visit, he was off of PF and did not have any recurrent symptoms off tx. Exam and OCT demonstrate improved inflammation and CME. Discussed R/B/A of cont off PF vs restart PF vs change to QD. Patient elects to continue off drops. If no improvement with drops, may require repeat PPV to remove ERM and search for any remaining RLF; this does not appear to be related to persistent infection as patient's symptoms appear to improve without antibiotics.   
 
RTC 6 mo with OCT OU (mac and RNFL)

## 2023-01-03 NOTE — IMPRESSION/PLAN
Impression: Dry eye syndrome of bilateral lacrimal glands: H04.123. Plan: Patient notes tearing OD. This has improved with irrigation by Dr. Shi Madrid of cyst overlying lacrimal duct he is s/p DCR with improvement. Also recommend switch to PFATs, as he continues with PEE OD>OS.

## 2023-07-11 ENCOUNTER — OFFICE VISIT (OUTPATIENT)
Dept: URBAN - METROPOLITAN AREA CLINIC 13 | Facility: CLINIC | Age: 78
End: 2023-07-11
Payer: COMMERCIAL

## 2023-07-11 DIAGNOSIS — H30.91 UNSPECIFIED CHORIORETINAL INFLAMMATION, RIGHT EYE: Primary | ICD-10-CM

## 2023-07-11 DIAGNOSIS — H04.123 DRY EYE SYNDROME OF BILATERAL LACRIMAL GLANDS: ICD-10-CM

## 2023-07-11 DIAGNOSIS — H59.029 LENS FRAGMENT IN EYE FOLLOWING CATARACT SURGERY: ICD-10-CM

## 2023-07-11 DIAGNOSIS — H40.9 GLAUCOMA: ICD-10-CM

## 2023-07-11 DIAGNOSIS — H25.12 AGE-RELATED NUCLEAR CATARACT, LEFT EYE: ICD-10-CM

## 2023-07-11 DIAGNOSIS — H43.812 VITREOUS DEGENERATION, LEFT EYE: ICD-10-CM

## 2023-07-11 PROCEDURE — 92134 CPTRZ OPH DX IMG PST SGM RTA: CPT | Performed by: OPHTHALMOLOGY

## 2023-07-11 PROCEDURE — 92133 CPTRZD OPH DX IMG PST SGM ON: CPT | Performed by: OPHTHALMOLOGY

## 2023-07-11 PROCEDURE — 92014 COMPRE OPH EXAM EST PT 1/>: CPT | Performed by: OPHTHALMOLOGY

## 2023-07-11 ASSESSMENT — INTRAOCULAR PRESSURE
OD: 15
OS: 17

## 2023-07-11 NOTE — IMPRESSION/PLAN
Impression: Age-related nuclear cataract, left eye: H25.12. Plan: May be visually significant.   Followed by Dr. Shira Munoz

## 2023-07-11 NOTE — IMPRESSION/PLAN
Impression: Dry eye syndrome of bilateral lacrimal glands: H04.123. Plan: Patient notes tearing OD. This has improved with irrigation by Dr. Alan Dominguez of cyst overlying lacrimal duct he is s/p DCR with improvement. Also recommend switch to PFATs, as he continues with PEE OD>OS.

## 2023-07-11 NOTE — IMPRESSION/PLAN
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 Patient presented with recurrent pain on 6/9/2021. Plan: Patient has been using PF PRN for GD. At last visit, he was off of PF and did not have any recurrent symptoms off tx. Exam and OCT demonstrate improved inflammation and CME. Discussed R/B/A of cont off PF vs restart PF vs change to QD. Patient elects to continue off drops. If no improvement with drops, may require repeat PPV to remove ERM and search for any remaining RLF; this does not appear to be related to persistent infection as patient's symptoms appear to improve without antibiotics.   
 
RTC 12 mo with OCT OU (mac and RNFL)

## 2023-07-11 NOTE — IMPRESSION/PLAN
Impression: Glaucoma Suspect: H40.9.
- possible allergy to brimonidine RNFl 97/83 <-  92/78 Plan: Exam demonstrates mild glaucomatous cupping OU. Fortunately, there is no NVI today. IOP is improved OD>OS after SLT laser; discussed surgery vs laser vs gtts vs observation. Recommend cont off Cosopt for now and d/c brimonidine.   Cont appt with Dr. Brisa Faye

## 2023-08-10 NOTE — IMPRESSION/PLAN
Caller: Pramod Leone    Relationship: Self    Best call back number: [phone]     What is the best time to reach you: ANYTIME    Who are you requesting to speak with (clinical staff, provider,  specific staff member): NON-CLINICAL    What was the call regarding: PT STATES HE WAS SPEAKING WITH A NURSE THIS MORNING AND THEY WERE TALKING ABOUT HIS INFUSIONS. PT WOULD LIKE TO SEE ABOUT GETTING HIS NEXT 3 INFUSIONS SCHEDULED.     PLEASE CALL TO DISCUSS.         Impression: Aphakia: H27.00.  Plan: see plan above for RLF

## 2024-02-13 ENCOUNTER — OFFICE VISIT (OUTPATIENT)
Dept: URBAN - METROPOLITAN AREA CLINIC 13 | Facility: CLINIC | Age: 79
End: 2024-02-13
Payer: COMMERCIAL

## 2024-02-13 DIAGNOSIS — H59.029 LENS FRAGMENT IN EYE FOLLOWING CATARACT SURGERY: ICD-10-CM

## 2024-02-13 DIAGNOSIS — H04.123 DRY EYE SYNDROME OF BILATERAL LACRIMAL GLANDS: ICD-10-CM

## 2024-02-13 DIAGNOSIS — H43.812 VITREOUS DEGENERATION, LEFT EYE: ICD-10-CM

## 2024-02-13 DIAGNOSIS — H30.91 UNSPECIFIED CHORIORETINAL INFLAMMATION, RIGHT EYE: Primary | ICD-10-CM

## 2024-02-13 DIAGNOSIS — H40.9 GLAUCOMA: ICD-10-CM

## 2024-02-13 DIAGNOSIS — H25.12 AGE-RELATED NUCLEAR CATARACT, LEFT EYE: ICD-10-CM

## 2024-02-13 PROCEDURE — 92134 CPTRZ OPH DX IMG PST SGM RTA: CPT | Performed by: OPHTHALMOLOGY

## 2024-02-13 PROCEDURE — 92014 COMPRE OPH EXAM EST PT 1/>: CPT | Performed by: OPHTHALMOLOGY

## 2024-02-13 ASSESSMENT — INTRAOCULAR PRESSURE
OD: 14
OS: 16

## 2024-12-24 ENCOUNTER — OFFICE VISIT (OUTPATIENT)
Dept: URBAN - METROPOLITAN AREA CLINIC 13 | Facility: CLINIC | Age: 79
End: 2024-12-24
Payer: COMMERCIAL

## 2024-12-24 DIAGNOSIS — H43.812 VITREOUS DEGENERATION, LEFT EYE: ICD-10-CM

## 2024-12-24 DIAGNOSIS — H25.12 AGE-RELATED NUCLEAR CATARACT, LEFT EYE: ICD-10-CM

## 2024-12-24 DIAGNOSIS — H04.123 DRY EYE SYNDROME OF BILATERAL LACRIMAL GLANDS: ICD-10-CM

## 2024-12-24 DIAGNOSIS — H30.91 UNSPECIFIED CHORIORETINAL INFLAMMATION, RIGHT EYE: Primary | ICD-10-CM

## 2024-12-24 DIAGNOSIS — H40.9 GLAUCOMA: ICD-10-CM

## 2024-12-24 DIAGNOSIS — H59.029 LENS FRAGMENT IN EYE FOLLOWING CATARACT SURGERY: ICD-10-CM

## 2024-12-24 PROCEDURE — 92134 CPTRZ OPH DX IMG PST SGM RTA: CPT | Performed by: OPHTHALMOLOGY

## 2024-12-24 PROCEDURE — 99214 OFFICE O/P EST MOD 30 MIN: CPT | Performed by: OPHTHALMOLOGY

## 2024-12-24 ASSESSMENT — INTRAOCULAR PRESSURE
OD: 14
OS: 14